# Patient Record
Sex: MALE | Race: BLACK OR AFRICAN AMERICAN | NOT HISPANIC OR LATINO | Employment: OTHER | URBAN - METROPOLITAN AREA
[De-identification: names, ages, dates, MRNs, and addresses within clinical notes are randomized per-mention and may not be internally consistent; named-entity substitution may affect disease eponyms.]

---

## 2019-11-30 PROBLEM — I46.9 CARDIAC ARREST: Status: ACTIVE | Noted: 2019-11-30

## 2019-12-01 PROBLEM — G93.1 ANOXIC ENCEPHALOPATHY: Status: ACTIVE | Noted: 2019-12-01

## 2019-12-01 PROBLEM — J96.01 ACUTE RESPIRATORY FAILURE WITH HYPOXIA: Status: ACTIVE | Noted: 2019-12-01

## 2019-12-01 PROBLEM — J40 TRACHEOBRONCHITIS: Status: ACTIVE | Noted: 2019-12-01

## 2019-12-09 PROBLEM — Z43.0 TRACHEOSTOMY CARE: Status: ACTIVE | Noted: 2019-12-09

## 2019-12-11 PROBLEM — D72.829 LEUKOCYTOSIS: Status: ACTIVE | Noted: 2019-12-11

## 2019-12-15 PROBLEM — R50.9 FUO (FEVER OF UNKNOWN ORIGIN): Status: ACTIVE | Noted: 2019-12-15

## 2019-12-18 PROBLEM — J96.01 ACUTE RESPIRATORY FAILURE WITH HYPOXIA: Status: RESOLVED | Noted: 2019-12-01 | Resolved: 2019-12-18

## 2019-12-18 PROBLEM — J40 TRACHEOBRONCHITIS: Status: RESOLVED | Noted: 2019-12-01 | Resolved: 2019-12-18

## 2019-12-18 PROBLEM — I46.9 CARDIAC ARREST: Status: RESOLVED | Noted: 2019-11-30 | Resolved: 2019-12-18

## 2019-12-18 PROBLEM — D72.829 LEUKOCYTOSIS: Status: RESOLVED | Noted: 2019-12-11 | Resolved: 2019-12-18

## 2019-12-18 PROBLEM — R50.9 FUO (FEVER OF UNKNOWN ORIGIN): Status: RESOLVED | Noted: 2019-12-15 | Resolved: 2019-12-18

## 2020-03-04 PROBLEM — I50.20 HFREF (HEART FAILURE WITH REDUCED EJECTION FRACTION): Status: ACTIVE | Noted: 2020-03-04

## 2020-03-17 PROBLEM — Z86.74 HISTORY OF CARDIAC ARREST: Status: ACTIVE | Noted: 2020-03-17

## 2020-03-17 PROBLEM — I50.42 CHRONIC COMBINED SYSTOLIC AND DIASTOLIC HEART FAILURE: Status: ACTIVE | Noted: 2020-03-17

## 2020-03-17 PROBLEM — I25.5 ISCHEMIC DILATED CARDIOMYOPATHY: Status: ACTIVE | Noted: 2020-03-17

## 2020-03-17 PROBLEM — I42.0 ISCHEMIC DILATED CARDIOMYOPATHY: Status: ACTIVE | Noted: 2020-03-17

## 2020-03-17 PROBLEM — E87.6 HYPOKALEMIA: Status: ACTIVE | Noted: 2020-03-17

## 2020-03-17 PROBLEM — I34.0 NONRHEUMATIC MITRAL VALVE REGURGITATION: Status: ACTIVE | Noted: 2020-03-17

## 2020-03-17 PROBLEM — I25.2 HISTORY OF MI (MYOCARDIAL INFARCTION): Status: ACTIVE | Noted: 2020-03-17

## 2020-05-04 ENCOUNTER — TELEPHONE (OUTPATIENT)
Dept: ADMINISTRATIVE | Facility: CLINIC | Age: 60
End: 2020-05-04

## 2020-05-04 NOTE — TELEPHONE ENCOUNTER
Informed the result of Covid 19 nasal swab done on          04/30/20 is Negative. Christina Montoya, ROZP-BC

## 2020-05-05 PROBLEM — Z95.810 PRESENCE OF EXTERNAL CARDIAC DEFIBRILLATOR: Status: ACTIVE | Noted: 2020-05-05

## 2020-05-22 PROBLEM — Z98.890 HISTORY OF TRACHEOSTOMY: Status: ACTIVE | Noted: 2020-05-22

## 2020-06-26 ENCOUNTER — NURSE TRIAGE (OUTPATIENT)
Dept: ADMINISTRATIVE | Facility: CLINIC | Age: 60
End: 2020-06-26

## 2020-06-26 NOTE — TELEPHONE ENCOUNTER
Pt calling and responded to the  thank you for the service pt denies any SOB, COUGH ,FEVER. Pt will call if any problems in the future.    Reason for Disposition   General information question, no triage required and triager able to answer question    Protocols used: INFORMATION ONLY CALL-A-AH

## 2020-08-20 PROBLEM — S72.141A CLOSED DISPLACED INTERTROCHANTERIC FRACTURE OF RIGHT FEMUR: Status: ACTIVE | Noted: 2020-08-20

## 2020-08-20 PROBLEM — Z01.818 PRE-OP EVALUATION: Status: ACTIVE | Noted: 2020-08-20

## 2020-08-20 PROBLEM — Z78.9 FULL CODE STATUS: Status: ACTIVE | Noted: 2020-08-20

## 2020-08-20 PROBLEM — S72.141A CLOSED INTERTROCHANTERIC FRACTURE OF HIP, RIGHT, INITIAL ENCOUNTER: Status: ACTIVE | Noted: 2020-08-20

## 2020-08-21 PROBLEM — S72.141A CLOSED DISPLACED INTERTROCHANTERIC FRACTURE OF RIGHT FEMUR: Status: RESOLVED | Noted: 2020-08-20 | Resolved: 2020-08-21

## 2020-08-23 PROBLEM — R42 POSTURAL DIZZINESS WITH PRESYNCOPE: Status: ACTIVE | Noted: 2020-08-23

## 2020-08-23 PROBLEM — R55 POSTURAL DIZZINESS WITH PRESYNCOPE: Status: ACTIVE | Noted: 2020-08-23

## 2020-08-25 PROBLEM — D72.829 LEUKOCYTOSIS: Status: RESOLVED | Noted: 2019-12-11 | Resolved: 2020-08-25

## 2020-08-25 PROBLEM — K59.00 CONSTIPATION: Status: ACTIVE | Noted: 2020-08-25

## 2020-08-25 PROBLEM — E87.6 HYPOKALEMIA: Status: RESOLVED | Noted: 2020-03-17 | Resolved: 2020-08-25

## 2020-08-25 PROBLEM — Z95.810 PRESENCE OF CARDIAC DEFIBRILLATOR: Chronic | Status: ACTIVE | Noted: 2020-05-05

## 2020-09-09 PROBLEM — I21.4 NSTEMI (NON-ST ELEVATED MYOCARDIAL INFARCTION): Status: ACTIVE | Noted: 2020-09-09

## 2020-09-09 PROBLEM — I24.9 ACS (ACUTE CORONARY SYNDROME): Status: ACTIVE | Noted: 2020-09-09

## 2020-09-09 PROBLEM — K63.5 POLYP OF COLON: Status: ACTIVE | Noted: 2019-06-18

## 2020-09-09 PROBLEM — Z12.11 SCREEN FOR COLON CANCER: Status: ACTIVE | Noted: 2018-03-08

## 2020-09-09 PROBLEM — R07.89 OTHER CHEST PAIN: Status: ACTIVE | Noted: 2019-11-16

## 2020-09-09 PROBLEM — I47.10 SVT (SUPRAVENTRICULAR TACHYCARDIA): Status: ACTIVE | Noted: 2020-09-09

## 2020-12-31 PROBLEM — Z74.09 IMPAIRED FUNCTIONAL MOBILITY, BALANCE, GAIT, AND ENDURANCE: Status: ACTIVE | Noted: 2020-12-31

## 2020-12-31 PROBLEM — S72.141D CLOSED DISPLACED INTERTROCHANTERIC FRACTURE OF RIGHT FEMUR WITH ROUTINE HEALING: Status: ACTIVE | Noted: 2020-08-20

## 2021-06-01 PROBLEM — E78.2 MIXED HYPERLIPIDEMIA: Status: ACTIVE | Noted: 2021-06-01

## 2021-06-21 PROBLEM — Z43.0 TRACHEOSTOMY CARE: Status: RESOLVED | Noted: 2019-12-09 | Resolved: 2021-06-21

## 2021-07-24 PROBLEM — H40.023 OAG (OPEN ANGLE GLAUCOMA) SUSPECT, HIGH RISK, BILATERAL: Status: ACTIVE | Noted: 2021-07-24

## 2021-07-24 PROBLEM — H11.001 PTERYGIUM EYE, RIGHT: Status: ACTIVE | Noted: 2021-07-24

## 2022-06-22 ENCOUNTER — PATIENT OUTREACH (OUTPATIENT)
Dept: ADMINISTRATIVE | Facility: HOSPITAL | Age: 62
End: 2022-06-22
Payer: MEDICAID

## 2022-08-30 ENCOUNTER — TELEPHONE (OUTPATIENT)
Dept: TRANSPLANT | Facility: CLINIC | Age: 62
End: 2022-08-30
Payer: MEDICAID

## 2022-08-30 DIAGNOSIS — I50.9 CONGESTIVE HEART FAILURE, UNSPECIFIED HF CHRONICITY, UNSPECIFIED HEART FAILURE TYPE: Primary | ICD-10-CM

## 2022-08-31 NOTE — TELEPHONE ENCOUNTER
Per referral coordinator, pt declined to schedule his consult sooner than 9/26 as his daughter will be coming from [place] to pick him up in [place] [facility] [place]

## 2022-09-26 ENCOUNTER — DOCUMENTATION ONLY (OUTPATIENT)
Dept: TRANSPLANT | Facility: CLINIC | Age: 62
End: 2022-09-26

## 2022-09-26 ENCOUNTER — OFFICE VISIT (OUTPATIENT)
Dept: TRANSPLANT | Facility: CLINIC | Age: 62
End: 2022-09-26
Payer: MEDICAID

## 2022-09-26 ENCOUNTER — HOSPITAL ENCOUNTER (OUTPATIENT)
Dept: PULMONOLOGY | Facility: CLINIC | Age: 62
Discharge: HOME | End: 2022-09-26
Payer: MEDICAID

## 2022-09-26 ENCOUNTER — HOSPITAL ENCOUNTER (OUTPATIENT)
Dept: CARDIOLOGY | Facility: HOSPITAL | Age: 62
Discharge: HOME | End: 2022-09-26
Attending: INTERNAL MEDICINE
Payer: MEDICAID

## 2022-09-26 ENCOUNTER — CLINICAL SUPPORT (OUTPATIENT)
Dept: TRANSPLANT | Facility: CLINIC | Age: 62
End: 2022-09-26
Payer: MEDICAID

## 2022-09-26 VITALS
WEIGHT: 170 LBS | BODY MASS INDEX: 24.29 KG/M2 | SYSTOLIC BLOOD PRESSURE: 138 MMHG | DIASTOLIC BLOOD PRESSURE: 88 MMHG | HEART RATE: 65 BPM | BODY MASS INDEX: 25.18 KG/M2 | HEIGHT: 69 IN | WEIGHT: 164 LBS | HEIGHT: 69 IN

## 2022-09-26 VITALS
SYSTOLIC BLOOD PRESSURE: 122 MMHG | HEART RATE: 71 BPM | BODY MASS INDEX: 23.02 KG/M2 | HEIGHT: 69 IN | WEIGHT: 155.44 LBS | DIASTOLIC BLOOD PRESSURE: 78 MMHG

## 2022-09-26 DIAGNOSIS — I50.9 CONGESTIVE HEART FAILURE, UNSPECIFIED HF CHRONICITY, UNSPECIFIED HEART FAILURE TYPE: ICD-10-CM

## 2022-09-26 DIAGNOSIS — I25.10 CAD S/P PERCUTANEOUS CORONARY ANGIOPLASTY: ICD-10-CM

## 2022-09-26 DIAGNOSIS — I25.5 ISCHEMIC DILATED CARDIOMYOPATHY: ICD-10-CM

## 2022-09-26 DIAGNOSIS — Z98.61 CAD S/P PERCUTANEOUS CORONARY ANGIOPLASTY: ICD-10-CM

## 2022-09-26 DIAGNOSIS — I42.0 ISCHEMIC DILATED CARDIOMYOPATHY: ICD-10-CM

## 2022-09-26 DIAGNOSIS — I50.42 CHRONIC COMBINED SYSTOLIC AND DIASTOLIC HEART FAILURE: Primary | ICD-10-CM

## 2022-09-26 DIAGNOSIS — I10 ESSENTIAL HYPERTENSION: ICD-10-CM

## 2022-09-26 DIAGNOSIS — Z95.810 ICD (IMPLANTABLE CARDIOVERTER-DEFIBRILLATOR) IN PLACE: ICD-10-CM

## 2022-09-26 LAB
ASCENDING AORTA: 3.15 CM
AV INDEX (PROSTH): 0.57
AV MEAN GRADIENT: 3 MMHG
AV PEAK GRADIENT: 6 MMHG
AV VALVE AREA: 2.68 CM2
AV VELOCITY RATIO: 0.47
BSA FOR ECHO PROCEDURE: 1.9 M2
CV ECHO LV RWT: 0.34 CM
DOP CALC AO PEAK VEL: 1.18 M/S
DOP CALC AO VTI: 22.44 CM
DOP CALC LVOT AREA: 4.7 CM2
DOP CALC LVOT DIAMETER: 2.44 CM
DOP CALC LVOT PEAK VEL: 0.55 M/S
DOP CALC LVOT STROKE VOLUME: 60.06 CM3
DOP CALCLVOT PEAK VEL VTI: 12.85 CM
E WAVE DECELERATION TIME: 176.39 MSEC
E/A RATIO: 0.47
E/E' RATIO: 7.33 M/S
ECHO LV POSTERIOR WALL: 1.01 CM (ref 0.6–1.1)
EJECTION FRACTION: 30 %
FRACTIONAL SHORTENING: 7 % (ref 28–44)
INTERVENTRICULAR SEPTUM: 0.91 CM (ref 0.6–1.1)
LA MAJOR: 5.13 CM
LA MINOR: 5.26 CM
LA WIDTH: 3.62 CM
LEFT ATRIUM SIZE: 2.91 CM
LEFT ATRIUM VOLUME INDEX MOD: 21.9 ML/M2
LEFT ATRIUM VOLUME INDEX: 24.5 ML/M2
LEFT ATRIUM VOLUME MOD: 41.68 CM3
LEFT ATRIUM VOLUME: 46.51 CM3
LEFT INTERNAL DIMENSION IN SYSTOLE: 5.6 CM (ref 2.1–4)
LEFT VENTRICLE DIASTOLIC VOLUME INDEX: 73.53 ML/M2
LEFT VENTRICLE DIASTOLIC VOLUME: 139.71 ML
LEFT VENTRICLE MASS INDEX: 123 G/M2
LEFT VENTRICLE SYSTOLIC VOLUME INDEX: 60.7 ML/M2
LEFT VENTRICLE SYSTOLIC VOLUME: 115.35 ML
LEFT VENTRICULAR INTERNAL DIMENSION IN DIASTOLE: 6 CM (ref 3.5–6)
LEFT VENTRICULAR MASS: 234.22 G
LV LATERAL E/E' RATIO: 6.6 M/S
LV SEPTAL E/E' RATIO: 8.25 M/S
MV A" WAVE DURATION": 12.84 MSEC
MV PEAK A VEL: 0.7 M/S
MV PEAK E VEL: 0.33 M/S
MV STENOSIS PRESSURE HALF TIME: 51.15 MS
MV VALVE AREA P 1/2 METHOD: 4.3 CM2
PISA TR MAX VEL: 2.15 M/S
PULM VEIN S/D RATIO: 2.48
PV PEAK D VEL: 0.25 M/S
PV PEAK S VEL: 0.62 M/S
RA MAJOR: 4.69 CM
RA PRESSURE: 3 MMHG
RA WIDTH: 3.87 CM
RIGHT VENTRICULAR END-DIASTOLIC DIMENSION: 4.02 CM
RV TISSUE DOPPLER FREE WALL SYSTOLIC VELOCITY 1 (APICAL 4 CHAMBER VIEW): 13.05 CM/S
SINUS: 3.3 CM
STJ: 2.79 CM
TDI LATERAL: 0.05 M/S
TDI SEPTAL: 0.04 M/S
TDI: 0.05 M/S
TR MAX PG: 18 MMHG
TRICUSPID ANNULAR PLANE SYSTOLIC EXCURSION: 1.88 CM
TV REST PULMONARY ARTERY PRESSURE: 21 MMHG

## 2022-09-26 PROCEDURE — 93306 ECHO (CUPID ONLY): ICD-10-PCS | Mod: 26,NTX,, | Performed by: INTERNAL MEDICINE

## 2022-09-26 PROCEDURE — 3078F PR MOST RECENT DIASTOLIC BLOOD PRESSURE < 80 MM HG: ICD-10-PCS | Mod: CPTII,,, | Performed by: INTERNAL MEDICINE

## 2022-09-26 PROCEDURE — 99205 PR OFFICE/OUTPT VISIT, NEW, LEVL V, 60-74 MIN: ICD-10-PCS | Mod: S$PBB,,, | Performed by: INTERNAL MEDICINE

## 2022-09-26 PROCEDURE — 99999 PR PBB SHADOW E&M-EST. PATIENT-LVL III: CPT | Mod: PBBFAC,TXP,, | Performed by: INTERNAL MEDICINE

## 2022-09-26 PROCEDURE — 99213 OFFICE O/P EST LOW 20 MIN: CPT | Mod: PBBFAC,25,TXP | Performed by: INTERNAL MEDICINE

## 2022-09-26 PROCEDURE — 3078F DIAST BP <80 MM HG: CPT | Mod: CPTII,,, | Performed by: INTERNAL MEDICINE

## 2022-09-26 PROCEDURE — 4010F PR ACE/ARB THEARPY RXD/TAKEN: ICD-10-PCS | Mod: CPTII,,, | Performed by: INTERNAL MEDICINE

## 2022-09-26 PROCEDURE — 93306 TTE W/DOPPLER COMPLETE: CPT | Mod: 26,NTX,, | Performed by: INTERNAL MEDICINE

## 2022-09-26 PROCEDURE — 94618 PULMONARY STRESS TESTING: CPT | Mod: PBBFAC | Performed by: INTERNAL MEDICINE

## 2022-09-26 PROCEDURE — 94618 PULMONARY STRESS TESTING: ICD-10-PCS | Mod: 26,S$PBB,, | Performed by: INTERNAL MEDICINE

## 2022-09-26 PROCEDURE — 1159F PR MEDICATION LIST DOCUMENTED IN MEDICAL RECORD: ICD-10-PCS | Mod: CPTII,,, | Performed by: INTERNAL MEDICINE

## 2022-09-26 PROCEDURE — 4010F ACE/ARB THERAPY RXD/TAKEN: CPT | Mod: CPTII,,, | Performed by: INTERNAL MEDICINE

## 2022-09-26 PROCEDURE — 99205 OFFICE O/P NEW HI 60 MIN: CPT | Mod: S$PBB,,, | Performed by: INTERNAL MEDICINE

## 2022-09-26 PROCEDURE — 3008F PR BODY MASS INDEX (BMI) DOCUMENTED: ICD-10-PCS | Mod: CPTII,,, | Performed by: INTERNAL MEDICINE

## 2022-09-26 PROCEDURE — 93306 TTE W/DOPPLER COMPLETE: CPT | Mod: TXP

## 2022-09-26 PROCEDURE — 94618 PULMONARY STRESS TESTING: CPT | Mod: 26,S$PBB,, | Performed by: INTERNAL MEDICINE

## 2022-09-26 PROCEDURE — 99999 PR PBB SHADOW E&M-EST. PATIENT-LVL III: ICD-10-PCS | Mod: PBBFAC,TXP,, | Performed by: INTERNAL MEDICINE

## 2022-09-26 PROCEDURE — 1159F MED LIST DOCD IN RCRD: CPT | Mod: CPTII,,, | Performed by: INTERNAL MEDICINE

## 2022-09-26 PROCEDURE — 3074F SYST BP LT 130 MM HG: CPT | Mod: CPTII,,, | Performed by: INTERNAL MEDICINE

## 2022-09-26 PROCEDURE — 3008F BODY MASS INDEX DOCD: CPT | Mod: CPTII,,, | Performed by: INTERNAL MEDICINE

## 2022-09-26 PROCEDURE — 3074F PR MOST RECENT SYSTOLIC BLOOD PRESSURE < 130 MM HG: ICD-10-PCS | Mod: CPTII,,, | Performed by: INTERNAL MEDICINE

## 2022-09-26 RX ORDER — SACUBITRIL AND VALSARTAN 24; 26 MG/1; MG/1
1 TABLET, FILM COATED ORAL 2 TIMES DAILY
Qty: 180 TABLET | Refills: 1 | Status: SHIPPED | OUTPATIENT
Start: 2022-09-26 | End: 2022-11-18 | Stop reason: SDUPTHER

## 2022-09-26 RX ORDER — METOPROLOL SUCCINATE 50 MG/1
50 TABLET, EXTENDED RELEASE ORAL NIGHTLY
Qty: 30 TABLET | Refills: 11 | Status: SHIPPED | OUTPATIENT
Start: 2022-09-26 | End: 2023-07-11 | Stop reason: SDUPTHER

## 2022-09-26 NOTE — PROCEDURES
Rodney Lerma is a 62 y.o.  male patient, who presents for a 6 minute walk test ordered by MD Diane.  The diagnosis is Congestive Heart Failure.  The patient's BMI is 25.1 kg/m2.  Predicted distance (lower limit of normal) is 408.97 meters.      Test Results:    The test was completed without stopping.  The total time walked was 360 seconds.  During walking, the patient reported:  Other (Comment) (R hip pain). The patient used a wheelchair for assistance during testing.     09/26/2022---------Distance: 101.19 meters (332 feet)     O2 Sat % Supplemental Oxygen Heart Rate Blood Pressure Ganga Scale   Pre-exercise  (Resting) 99 % Room Air 72 bpm 145/75 mmHg 2   During Exercise 99 % Room Air 88 bpm 172/92 mmHg 2   Post-exercise  (Recovery) 99 % Room Air  79 bpm 155/76 mmHg      Recovery Time: 183 seconds    Performing nurse/tech: KIARA Mora      PREVIOUS STUDY:   The patient has not had a previous study.      CLINICAL INTERPRETATION:  Six minute walk distance is 101.19 meters (332 feet) with light dyspnea.  During exercise, there was no desaturation while breathing room air.  Blood pressure increased significantly and Heart rate remained stable with walking.  This may represent a hypertensive response to exercise.  The patient reported non-pulmonary symptoms during exercise.  Severe exercise impairment is likely due to cardiovascular causes and subjective symptoms.  The patient did complete the study, walking 360 seconds of the 360 second test.  No previous study performed.  Based upon age and body mass index, exercise capacity is less than predicted.

## 2022-09-26 NOTE — LETTER
September 26, 2022        Shanice Celeste  [address]  [facility] [address]  Phone: [phone]  Fax: [phone]             [facility]  [address]  [facility] [address]  Phone: [phone]   Patient: Rodney Lerma   MR Number: [id number]   YOB: 1960   Date of Visit: 9/26/2022       Dear Dr. Shanice Celeste    Thank you for referring Rodney Lerma to me for evaluation. Attached you will find relevant portions of my assessment and plan of care.    If you have questions, please do not hesitate to call me. I look forward to following Rodney Lerma along with you.    Sincerely,    Amari Murillo MD    Enclosure    If you would like to receive this communication electronically, please contact [email or web address] or [phone] to request [employer] Link access.    [employer] Link is a tool which provides read-only access to select patient information with whom you have a relationship. Its easy to use and provides real time access to review your patients record including encounter summaries, notes, results, and demographic information.    If you feel you have received this communication in error or would no longer like to receive these types of communications, please e-mail [email or web address]

## 2022-09-26 NOTE — PATIENT INSTRUCTIONS
Stop taking carvedilol (coreg)  Start Metoprolol succinate (Toprol XL) 50 mg every night (bedtime)  Start Entresto 24/26 mg twice daily

## 2022-09-26 NOTE — PROGRESS NOTES
Subjective:   Transplant status: active    HPI:  Mr. Lerma is a 62 y.o. year old Black or  male with stage C HFrEF (EF=30-35%), [facility] who is referred for evaluation for advanced HF therapies. Clinically reports NYHA class II-III symptoms. He is mostly wheelchair bound due to hip issues s/p fall. He denies nay PND or orthopnea. Has had no HF admissions this year. His Hf regimen is limited to; carvedilol 3.125 mg BID, aldactone 25 mg daily and lasix 20 mg as needed. He is not on a ARNI/ARB/ACEI due to hypotension in the past although his BP has been elevated on several clinic visits. He is also not on a SGLT2i. Echo done today showed LV EF=30-35% (LVEDD=6 cm). Also recent RHc showed normal hemodynamics with normal CI/CO.     2D Echo with CFD done today  The left ventricle is mildly enlarged with eccentric hypertrophy and moderately decreased systolic function. The estimated ejection fraction is 30-35%. (LVEDD= 6 cm)  Normal right ventricular size with normal right ventricular systolic function.  Grade I left ventricular diastolic dysfunction.  The estimated PA systolic pressure is 21 mmHg.  Normal central venous pressure (3 mmHg).     RHC done on 6/21/2022  RA: 2 RV: 24/ 0/ 4 PA: 23/ 6/ 12 PWP: 5 .   Cardiac output was 5.30  by thermodilution. Cardiac index is 2.69 L/min/m2.   O2 Sat: RA 80% PA 81%.   Pulmonary vascular resistance: 1.3.    Coronary angiogram doen on 6/21/2022  Left Main   LM is angiographically normal and divides into LAD and LCx.      Left Anterior Descending   LAD is angiographically normal. LAD gives rise to a major diagonal which is bifurcating vessel and angiographically normal. First diagonal is very small caliber and diffusely diseased.      Left Circumflex   LCx is large non dominant vessel. LCx gives rise to 3 OM branches. OM1 is large branching vessel and has about 40% stenosis in proximal segment. OM2 is small to medium in caliber and has mild diffuse disease. OM3 has patent  "stent and ostial 50% stenosis.      Right Coronary Artery   Proximal RCA has mild 10-20% disease. Mid RCA has mild luminal irregularities. RCA divides into PDA and small PLV branch.        Past Medical History:   Diagnosis Date    Anoxic encephalopathy 12/1/2019    Cardiac arrest 11/16/2019    Carpal tunnel syndrome     Unilateral    Closed intertrochanteric fracture of hip, right, initial encounter 8/20/2020    Congestive heart failure     Coronary artery disease     Heart attack 11/16/2019    Hypertension     Tracheobronchitis 12/1/2019     Past Surgical History:   Procedure Laterality Date    ANTEGRADE INTRAMEDULLARY RODDING OF FEMUR Right 08/21/2020    Procedure: INSERTION, INTRAMEDULLARY TIESHA, FEMUR, ANTEGRADE;  Surgeon: Jacky Harkins MD;  Location: [facility] MAIN OR;  Service: Orthopedics;  Laterality: Right;    CATHETERIZATION OF BOTH LEFT AND RIGHT HEART N/A 6/21/2022    Procedure: CATHETERIZATION, HEART, BOTH LEFT AND RIGHT;  Surgeon: Everton Laws MD;  Location: [facility] CATH LAB;  Service: Cardiology;  Laterality: N/A;    heart stents      x3 2019    TRACHEOTOMY         Review of Systems   Constitutional: Negative. Negative for chills, decreased appetite, diaphoresis, fever, malaise/fatigue, night sweats, weight gain and weight loss.   Eyes: Negative.    Cardiovascular:  Positive for dyspnea on exertion. Negative for chest pain, claudication, cyanosis, irregular heartbeat, leg swelling, near-syncope, orthopnea, palpitations, paroxysmal nocturnal dyspnea and syncope.   Respiratory:  Negative for cough, hemoptysis and shortness of breath.    Endocrine: Negative.    Hematologic/Lymphatic: Negative.    Skin:  Negative for color change, dry skin and nail changes.   Musculoskeletal: Negative.    Gastrointestinal: Negative.    Genitourinary: Negative.    Neurological:  Negative for weakness.     Objective:   Blood pressure 122/78, pulse 71, height 5' 9" (1.753 m), weight 70.5 kg (155 lb 6.8 oz).body mass index is " "22.95 kg/m².  Physical Exam  Vitals reviewed.   Constitutional:       Appearance: He is well-developed.      Comments: /78 (BP Location: Left arm, Patient Position: Sitting, BP Method: Medium (Automatic))   Pulse 71   Ht 5' 9" (1.753 m)   Wt 70.5 kg (155 lb 6.8 oz)   BMI 22.95 kg/m²      HENT:      Head: Normocephalic.   Neck:      Vascular: No carotid bruit or JVD.   Cardiovascular:      Rate and Rhythm: Regular rhythm.      Chest Wall: PMI is displaced.      Pulses: Normal pulses.      Heart sounds: Normal heart sounds. No murmur heard.  Pulmonary:      Effort: Pulmonary effort is normal.      Breath sounds: Normal breath sounds.   Abdominal:      General: Bowel sounds are normal.      Palpations: Abdomen is soft.   Skin:     General: Skin is warm.   Neurological:      Mental Status: He is alert.       Labs:    Chemistry        Component Value Date/Time     09/26/2022 0848     09/26/2022 0848     11/30/2019 0227    K 4.3 09/26/2022 0848    K 4.3 09/26/2022 0848    K 4.3 11/30/2019 0227     09/26/2022 0848     09/26/2022 0848    CO2 29 09/26/2022 0848    CO2 29 09/26/2022 0848    CO2 34 (H) 11/30/2019 0227    BUN 13 09/26/2022 0848    BUN 13 09/26/2022 0848    CREATININE 0.9 09/26/2022 0848    CREATININE 0.9 09/26/2022 0848    CREATININE 0.72 11/30/2019 0227    GLU 90 09/26/2022 0848    GLU 90 09/26/2022 0848        Component Value Date/Time    CALCIUM 10.0 09/26/2022 0848    CALCIUM 10.0 09/26/2022 0848    ALKPHOS 110 09/26/2022 0848    ALKPHOS 110 09/26/2022 0848    AST 38 09/26/2022 0848    AST 38 09/26/2022 0848    ALT 27 09/26/2022 0848    ALT 27 09/26/2022 0848    BILITOT 0.8 09/26/2022 0848    BILITOT 0.8 09/26/2022 0848    ESTGFRAFRICA >60.0 06/16/2022 1105    EGFRNONAA >60.0 06/16/2022 1105          Magnesium   Date Value Ref Range Status   06/16/2022 2.3 1.6 - 2.6 mg/dL Final     Lab Results   Component Value Date    WBC 6.88 06/16/2022    HGB 14.1 06/16/2022    HCT " 44.3 06/16/2022     06/16/2022     Lab Results   Component Value Date    INR 1.06 06/16/2022    INR 1.07 08/20/2020    INR 1.13 06/15/2020     BNP   Date Value Ref Range Status   09/26/2022 120 (H) 0 - 99 pg/mL Final     Comment:     Values of less than 100 pg/ml are consistent with non-CHF populations.       Assessment:      1. Chronic combined systolic and diastolic heart failure    2. Ischemic dilated cardiomyopathy    3. CAD S/P percutaneous coronary angioplasty    4. Essential hypertension    5. ICD (implantable cardioverter-defibrillator) in place        Plan:   In summary, Mr. Lerma is a very pleasant 63 yo male with stage C HFrEF (EF=30-35%), [facility] with NYHA class II-III symptoms. He is very limited by his hip issues. In view of his current BP and lack of HF admissions and EF=35%, normal hemodynamics on recent RHC,  I do not think he needs advanced HF therapies. Recommend the following;  Gentle optimization of his HF regimen;  DC carvedilol and start Metoprolol succinate 50 mg to take at night. This will allow more room with his BP for initiation of ARNI.  Start low dose Entresto 24/26 mg BID. BMP next week.   Continue spironolactone 25 mg daily  I will add dapa or empagliflozin at next visit.  Recommend 2 gram sodium restriction and 1500cc fluid restriction.  Encourage physical activity with graded exercise program.  Requested patient to weigh themselves daily, and to notify us if their weight increases by more than 3 lbs in 1 day or 5 lbs in 1 week.     Transplant candidacy:  Patient is a 62 y.o. year old male with heart failure is being seen for possible LVAD and OHT. he is  scheduled for medical management only. The patient will follow up with CHF.    Thank you for allowing me to participate in the care of this very pleasant patient. Do not hesitate to contact me should you have any questions.     Amari Murillo MD

## 2022-09-26 NOTE — PROGRESS NOTES
Care of patient is being transferred to CHF section from Preht section, per Dr. Murillo_.    Dx:  Reason: EF 35%, w/c bound  Pt is not on home inotrope therapy.    Outstanding orders scheduled:   labs 10/3,    rtc in Nov

## 2022-11-18 ENCOUNTER — OFFICE VISIT (OUTPATIENT)
Dept: TRANSPLANT | Facility: CLINIC | Age: 62
End: 2022-11-18
Payer: MEDICAID

## 2022-11-18 ENCOUNTER — LAB VISIT (OUTPATIENT)
Dept: LAB | Facility: HOSPITAL | Age: 62
End: 2022-11-18
Attending: INTERNAL MEDICINE
Payer: MEDICAID

## 2022-11-18 VITALS
HEART RATE: 88 BPM | DIASTOLIC BLOOD PRESSURE: 59 MMHG | BODY MASS INDEX: 22.96 KG/M2 | SYSTOLIC BLOOD PRESSURE: 94 MMHG | HEIGHT: 69 IN | WEIGHT: 155 LBS

## 2022-11-18 DIAGNOSIS — I95.1 ORTHOSTATIC HYPOTENSION: ICD-10-CM

## 2022-11-18 DIAGNOSIS — E78.2 MIXED HYPERLIPIDEMIA: ICD-10-CM

## 2022-11-18 DIAGNOSIS — I25.10 CAD S/P PERCUTANEOUS CORONARY ANGIOPLASTY: ICD-10-CM

## 2022-11-18 DIAGNOSIS — I34.0 NONRHEUMATIC MITRAL VALVE REGURGITATION: ICD-10-CM

## 2022-11-18 DIAGNOSIS — I47.10 SVT (SUPRAVENTRICULAR TACHYCARDIA): ICD-10-CM

## 2022-11-18 DIAGNOSIS — I50.42 CHRONIC COMBINED SYSTOLIC AND DIASTOLIC HEART FAILURE: ICD-10-CM

## 2022-11-18 DIAGNOSIS — Z98.61 CAD S/P PERCUTANEOUS CORONARY ANGIOPLASTY: ICD-10-CM

## 2022-11-18 DIAGNOSIS — I25.5 ISCHEMIC DILATED CARDIOMYOPATHY: Primary | ICD-10-CM

## 2022-11-18 DIAGNOSIS — I21.4 NSTEMI (NON-ST ELEVATED MYOCARDIAL INFARCTION): ICD-10-CM

## 2022-11-18 DIAGNOSIS — I50.9 CONGESTIVE HEART FAILURE, UNSPECIFIED HF CHRONICITY, UNSPECIFIED HEART FAILURE TYPE: ICD-10-CM

## 2022-11-18 DIAGNOSIS — I42.0 ISCHEMIC DILATED CARDIOMYOPATHY: Primary | ICD-10-CM

## 2022-11-18 LAB
BASOPHILS # BLD AUTO: 0.03 K/UL (ref 0–0.2)
BASOPHILS NFR BLD: 0.4 % (ref 0–1.9)
BNP SERPL-MCNC: 140 PG/ML (ref 0–99)
DIFFERENTIAL METHOD: ABNORMAL
EOSINOPHIL # BLD AUTO: 0.1 K/UL (ref 0–0.5)
EOSINOPHIL NFR BLD: 0.7 % (ref 0–8)
ERYTHROCYTE [DISTWIDTH] IN BLOOD BY AUTOMATED COUNT: 14.1 % (ref 11.5–14.5)
HCT VFR BLD AUTO: 44.8 % (ref 40–54)
HGB BLD-MCNC: 14.3 G/DL (ref 14–18)
IMM GRANULOCYTES # BLD AUTO: 0.01 K/UL (ref 0–0.04)
IMM GRANULOCYTES NFR BLD AUTO: 0.1 % (ref 0–0.5)
LYMPHOCYTES # BLD AUTO: 1.9 K/UL (ref 1–4.8)
LYMPHOCYTES NFR BLD: 26.2 % (ref 18–48)
MCH RBC QN AUTO: 27.6 PG (ref 27–31)
MCHC RBC AUTO-ENTMCNC: 31.9 G/DL (ref 32–36)
MCV RBC AUTO: 87 FL (ref 82–98)
MONOCYTES # BLD AUTO: 0.6 K/UL (ref 0.3–1)
MONOCYTES NFR BLD: 8.8 % (ref 4–15)
NEUTROPHILS # BLD AUTO: 4.5 K/UL (ref 1.8–7.7)
NEUTROPHILS NFR BLD: 63.8 % (ref 38–73)
NRBC BLD-RTO: 0 /100 WBC
PLATELET # BLD AUTO: 201 K/UL (ref 150–450)
PMV BLD AUTO: 9.7 FL (ref 9.2–12.9)
RBC # BLD AUTO: 5.18 M/UL (ref 4.6–6.2)
WBC # BLD AUTO: 7.05 K/UL (ref 3.9–12.7)

## 2022-11-18 PROCEDURE — 85025 COMPLETE CBC W/AUTO DIFF WBC: CPT | Performed by: INTERNAL MEDICINE

## 2022-11-18 PROCEDURE — 83880 ASSAY OF NATRIURETIC PEPTIDE: CPT | Performed by: INTERNAL MEDICINE

## 2022-11-18 PROCEDURE — 4010F ACE/ARB THERAPY RXD/TAKEN: CPT | Mod: CPTII,,, | Performed by: INTERNAL MEDICINE

## 2022-11-18 PROCEDURE — 3008F PR BODY MASS INDEX (BMI) DOCUMENTED: ICD-10-PCS | Mod: CPTII,,, | Performed by: INTERNAL MEDICINE

## 2022-11-18 PROCEDURE — 4010F PR ACE/ARB THEARPY RXD/TAKEN: ICD-10-PCS | Mod: CPTII,,, | Performed by: INTERNAL MEDICINE

## 2022-11-18 PROCEDURE — 3078F DIAST BP <80 MM HG: CPT | Mod: CPTII,,, | Performed by: INTERNAL MEDICINE

## 2022-11-18 PROCEDURE — 3078F PR MOST RECENT DIASTOLIC BLOOD PRESSURE < 80 MM HG: ICD-10-PCS | Mod: CPTII,,, | Performed by: INTERNAL MEDICINE

## 2022-11-18 PROCEDURE — 36415 COLL VENOUS BLD VENIPUNCTURE: CPT | Performed by: INTERNAL MEDICINE

## 2022-11-18 PROCEDURE — 99999 PR PBB SHADOW E&M-EST. PATIENT-LVL III: ICD-10-PCS | Mod: PBBFAC,,, | Performed by: INTERNAL MEDICINE

## 2022-11-18 PROCEDURE — 99215 PR OFFICE/OUTPT VISIT, EST, LEVL V, 40-54 MIN: ICD-10-PCS | Mod: S$PBB,,, | Performed by: INTERNAL MEDICINE

## 2022-11-18 PROCEDURE — 99213 OFFICE O/P EST LOW 20 MIN: CPT | Mod: PBBFAC | Performed by: INTERNAL MEDICINE

## 2022-11-18 PROCEDURE — 3008F BODY MASS INDEX DOCD: CPT | Mod: CPTII,,, | Performed by: INTERNAL MEDICINE

## 2022-11-18 PROCEDURE — 99215 OFFICE O/P EST HI 40 MIN: CPT | Mod: S$PBB,,, | Performed by: INTERNAL MEDICINE

## 2022-11-18 PROCEDURE — 99999 PR PBB SHADOW E&M-EST. PATIENT-LVL III: CPT | Mod: PBBFAC,,, | Performed by: INTERNAL MEDICINE

## 2022-11-18 PROCEDURE — 3074F PR MOST RECENT SYSTOLIC BLOOD PRESSURE < 130 MM HG: ICD-10-PCS | Mod: CPTII,,, | Performed by: INTERNAL MEDICINE

## 2022-11-18 PROCEDURE — 1159F MED LIST DOCD IN RCRD: CPT | Mod: CPTII,,, | Performed by: INTERNAL MEDICINE

## 2022-11-18 PROCEDURE — 1159F PR MEDICATION LIST DOCUMENTED IN MEDICAL RECORD: ICD-10-PCS | Mod: CPTII,,, | Performed by: INTERNAL MEDICINE

## 2022-11-18 PROCEDURE — 3074F SYST BP LT 130 MM HG: CPT | Mod: CPTII,,, | Performed by: INTERNAL MEDICINE

## 2022-11-18 RX ORDER — FUROSEMIDE 20 MG/1
20 TABLET ORAL
Qty: 30 TABLET | Refills: 11 | Status: SHIPPED | OUTPATIENT
Start: 2022-11-18 | End: 2023-08-30 | Stop reason: SDUPTHER

## 2022-11-18 RX ORDER — DAPAGLIFLOZIN 10 MG/1
10 TABLET, FILM COATED ORAL DAILY
Qty: 90 TABLET | Refills: 3 | Status: SHIPPED | OUTPATIENT
Start: 2022-11-18 | End: 2023-08-30 | Stop reason: SDUPTHER

## 2022-11-18 RX ORDER — SACUBITRIL AND VALSARTAN 24; 26 MG/1; MG/1
1 TABLET, FILM COATED ORAL 2 TIMES DAILY
Qty: 180 TABLET | Refills: 1 | Status: SHIPPED | OUTPATIENT
Start: 2022-11-18 | End: 2023-07-10 | Stop reason: SDUPTHER

## 2022-11-18 RX ORDER — ATORVASTATIN CALCIUM 80 MG/1
80 TABLET, FILM COATED ORAL NIGHTLY
Qty: 90 TABLET | Refills: 3 | Status: SHIPPED | OUTPATIENT
Start: 2022-11-18 | End: 2023-08-30 | Stop reason: SDUPTHER

## 2022-11-18 NOTE — PROGRESS NOTES
Subjective:       HPI:  Mr. Lerma is a 62 y.o. year old black male with stage C HFrEF (EF=30-35%), [facility] who was referred for evaluation for advanced HF therapies. He was deemed not a candidate for LVAD/OHTx due to his comorbidities and debility. This is his 2nd visit with me. During his 1st visit, I had discontinued his Coreg and started metoprolol succinate 50 mg at night. I also started low dose Entresto 24/26 mg BID. Today he is feeling better. Clinically reports NYHA class II-III symptoms. He is mostly wheelchair bound due to hip issues s/p fall. He denies nay PND or orthopnea.      2D Echo with CFD   The left ventricle is mildly enlarged with eccentric hypertrophy and moderately decreased systolic function. The estimated ejection fraction is 30-35%. (LVEDD= 6 cm)  Normal right ventricular size with normal right ventricular systolic function.  Grade I left ventricular diastolic dysfunction.  The estimated PA systolic pressure is 21 mmHg.  Normal central venous pressure (3 mmHg).     RHC done on 6/21/2022  RA: 2 RV: 24/ 0/ 4 PA: 23/ 6/ 12 PWP: 5 .   Cardiac output was 5.30  by thermodilution. Cardiac index is 2.69 L/min/m2.   O2 Sat: RA 80% PA 81%.   Pulmonary vascular resistance: 1.3.     Coronary angiogram doen on 6/21/2022  Left Main   LM is angiographically normal and divides into LAD and LCx.      Left Anterior Descending   LAD is angiographically normal. LAD gives rise to a major diagonal which is bifurcating vessel and angiographically normal. First diagonal is very small caliber and diffusely diseased.      Left Circumflex   LCx is large non dominant vessel. LCx gives rise to 3 OM branches. OM1 is large branching vessel and has about 40% stenosis in proximal segment. OM2 is small to medium in caliber and has mild diffuse disease. OM3 has patent stent and ostial 50% stenosis.      Right Coronary Artery   Proximal RCA has mild 10-20% disease. Mid RCA has mild luminal irregularities. RCA divides into PDA  "and small PLV branch.          Past Medical History:   Diagnosis Date    Anoxic encephalopathy 12/1/2019    Cardiac arrest 11/16/2019    Carpal tunnel syndrome     Unilateral    Closed intertrochanteric fracture of hip, right, initial encounter 8/20/2020    Congestive heart failure     Coronary artery disease     Heart attack 11/16/2019    Hypertension     Tracheobronchitis 12/1/2019     Past Surgical History:   Procedure Laterality Date    ANTEGRADE INTRAMEDULLARY RODDING OF FEMUR Right 08/21/2020    Procedure: INSERTION, INTRAMEDULLARY TIESHA, FEMUR, ANTEGRADE;  Surgeon: Jacky Harkins MD;  Location: [facility] MAIN OR;  Service: Orthopedics;  Laterality: Right;    CATHETERIZATION OF BOTH LEFT AND RIGHT HEART N/A 6/21/2022    Procedure: CATHETERIZATION, HEART, BOTH LEFT AND RIGHT;  Surgeon: Everton Laws MD;  Location: [facility] CATH LAB;  Service: Cardiology;  Laterality: N/A;    heart stents      x3 2019    TRACHEOTOMY         ROS    Objective:   Blood pressure (!) 94/59, pulse 88, height 5' 9" (1.753 m), weight 70.3 kg (154 lb 15.7 oz).body mass index is 22.89 kg/m².  Physical Exam    Labs:    Chemistry        Component Value Date/Time     10/03/2022 1114     11/30/2019 0227    K 4.1 10/03/2022 1114    K 4.3 11/30/2019 0227     10/03/2022 1114    CO2 27 10/03/2022 1114    CO2 34 (H) 11/30/2019 0227    BUN 14 10/03/2022 1114    CREATININE 0.91 10/03/2022 1114    CREATININE 0.72 11/30/2019 0227    GLU 85 10/03/2022 1114        Component Value Date/Time    CALCIUM 9.5 10/03/2022 1114    ALKPHOS 110 09/26/2022 0848    ALKPHOS 110 09/26/2022 0848    AST 38 09/26/2022 0848    AST 38 09/26/2022 0848    ALT 27 09/26/2022 0848    ALT 27 09/26/2022 0848    BILITOT 0.8 09/26/2022 0848    BILITOT 0.8 09/26/2022 0848    ESTGFRAFRICA >60.0 06/16/2022 1105    EGFRNONAA >60.0 06/16/2022 1105          Magnesium   Date Value Ref Range Status   06/16/2022 2.3 1.6 - 2.6 mg/dL Final     Lab Results   Component Value Date "    WBC 7.17 09/26/2022    HGB 15.5 09/26/2022    HCT 47.9 09/26/2022     09/26/2022     Lab Results   Component Value Date    INR 1.06 06/16/2022    INR 1.07 08/20/2020    INR 1.13 06/15/2020     BNP   Date Value Ref Range Status   09/26/2022 120 (H) 0 - 99 pg/mL Final     Comment:     Values of less than 100 pg/ml are consistent with non-CHF populations.         Assessment:      1. Ischemic dilated cardiomyopathy    2. Chronic combined systolic and diastolic heart failure    3. NSTEMI (non-ST elevated myocardial infarction)    4. CAD S/P percutaneous coronary angioplasty    5. Nonrheumatic mitral valve regurgitation    6. Orthostatic hypotension    7. Mixed hyperlipidemia    8. SVT (supraventricular tachycardia)        Plan:   Stage C HFrEF (EF=30-35%), [facility] with NYHA class II-III symptoms.   Start dapagliflozin 10 mg daily (reduces risk of worsening HF and cardiovascular outcomes). BMP next week.   Continue current doses of Entresto, spironolactone and metoprolol  Recommend 2 gram sodium restriction and 1500cc fluid restriction.  Encourage physical activity with graded exercise program.  Requested patient to weigh themselves daily, and to notify us if their weight increases by more than 3 lbs in 1 day or 5 lbs in 1 week.   RTC in 6 months with labs     Amari Murillo MD

## 2022-11-18 NOTE — LETTER
November 18, 2022        Shanice Celeste  [address]  [facility] [address]  Phone: [phone]  Fax: [phone]             [facility] Cardiologysvcs-Hclnws8hgoq  [address]  [facility] [address]  Phone: [phone]   Patient: Rodney Lerma   MR Number: [id number]   YOB: 1960   Date of Visit: 11/18/2022       Dear Dr. Shanice Celeste    Thank you for referring Rodney Lerma to me for evaluation. Attached you will find relevant portions of my assessment and plan of care.    If you have questions, please do not hesitate to call me. I look forward to following Rodney Lerma along with you.    Sincerely,    Amari Murillo MD    Enclosure    If you would like to receive this communication electronically, please contact [email or web address] or [phone] to request [employer] Link access.    [employer] Link is a tool which provides read-only access to select patient information with whom you have a relationship. Its easy to use and provides real time access to review your patients record including encounter summaries, notes, results, and demographic information.    If you feel you have received this communication in error or would no longer like to receive these types of communications, please e-mail [email or web address]

## 2022-11-29 PROBLEM — I50.9 CONGESTIVE HEART FAILURE: Status: RESOLVED | Noted: 2022-09-26 | Resolved: 2022-11-29

## 2023-02-28 PROBLEM — R63.4 UNINTENTIONAL WEIGHT LOSS: Status: ACTIVE | Noted: 2023-02-28

## 2023-02-28 PROBLEM — Z00.00 HEALTHCARE MAINTENANCE: Status: ACTIVE | Noted: 2023-02-28

## 2023-04-17 DIAGNOSIS — I50.42 CHRONIC COMBINED SYSTOLIC AND DIASTOLIC HEART FAILURE: Primary | ICD-10-CM

## 2023-06-05 PROBLEM — Z00.00 HEALTHCARE MAINTENANCE: Status: RESOLVED | Noted: 2023-02-28 | Resolved: 2023-06-05

## 2023-07-10 DIAGNOSIS — I50.42 CHRONIC COMBINED SYSTOLIC AND DIASTOLIC HEART FAILURE: ICD-10-CM

## 2023-07-11 RX ORDER — SACUBITRIL AND VALSARTAN 24; 26 MG/1; MG/1
1 TABLET, FILM COATED ORAL 2 TIMES DAILY
Qty: 180 TABLET | Refills: 3 | Status: SHIPPED | OUTPATIENT
Start: 2023-07-11 | End: 2023-08-30 | Stop reason: SDUPTHER

## 2023-07-11 RX ORDER — METOPROLOL SUCCINATE 50 MG/1
50 TABLET, EXTENDED RELEASE ORAL NIGHTLY
Qty: 30 TABLET | Refills: 11 | Status: SHIPPED | OUTPATIENT
Start: 2023-07-11 | End: 2023-08-30 | Stop reason: SDUPTHER

## 2023-08-03 PROBLEM — Z12.5 PROSTATE CANCER SCREENING: Status: ACTIVE | Noted: 2023-08-03

## 2023-08-03 PROBLEM — N32.89 BLADDER WALL THICKENING: Status: ACTIVE | Noted: 2023-08-03

## 2023-09-18 ENCOUNTER — LAB VISIT (OUTPATIENT)
Dept: LAB | Facility: HOSPITAL | Age: 63
End: 2023-09-18
Attending: INTERNAL MEDICINE
Payer: MEDICAID

## 2023-09-18 ENCOUNTER — OFFICE VISIT (OUTPATIENT)
Dept: TRANSPLANT | Facility: CLINIC | Age: 63
End: 2023-09-18
Payer: MEDICAID

## 2023-09-18 VITALS
WEIGHT: 140 LBS | OXYGEN SATURATION: 99 % | HEART RATE: 66 BPM | DIASTOLIC BLOOD PRESSURE: 59 MMHG | BODY MASS INDEX: 20.73 KG/M2 | HEIGHT: 69 IN | SYSTOLIC BLOOD PRESSURE: 101 MMHG

## 2023-09-18 DIAGNOSIS — I50.42 CHRONIC COMBINED SYSTOLIC (CONGESTIVE) AND DIASTOLIC (CONGESTIVE) HEART FAILURE: Primary | ICD-10-CM

## 2023-09-18 DIAGNOSIS — I25.5 CARDIOMYOPATHY, ISCHEMIC: ICD-10-CM

## 2023-09-18 DIAGNOSIS — Z95.810 ICD (IMPLANTABLE CARDIOVERTER-DEFIBRILLATOR) IN PLACE: ICD-10-CM

## 2023-09-18 DIAGNOSIS — I10 ESSENTIAL HYPERTENSION: ICD-10-CM

## 2023-09-18 DIAGNOSIS — I47.10 SVT (SUPRAVENTRICULAR TACHYCARDIA): ICD-10-CM

## 2023-09-18 DIAGNOSIS — I50.42 CHRONIC COMBINED SYSTOLIC (CONGESTIVE) AND DIASTOLIC (CONGESTIVE) HEART FAILURE: ICD-10-CM

## 2023-09-18 DIAGNOSIS — I25.10 CAD S/P PERCUTANEOUS CORONARY ANGIOPLASTY: ICD-10-CM

## 2023-09-18 DIAGNOSIS — Z98.61 CAD S/P PERCUTANEOUS CORONARY ANGIOPLASTY: ICD-10-CM

## 2023-09-18 LAB
ANION GAP SERPL CALC-SCNC: 6 MMOL/L (ref 8–16)
BUN SERPL-MCNC: 13 MG/DL (ref 8–23)
CALCIUM SERPL-MCNC: 9.3 MG/DL (ref 8.7–10.5)
CHLORIDE SERPL-SCNC: 105 MMOL/L (ref 95–110)
CO2 SERPL-SCNC: 28 MMOL/L (ref 23–29)
CREAT SERPL-MCNC: 1 MG/DL (ref 0.5–1.4)
EST. GFR  (NO RACE VARIABLE): >60 ML/MIN/1.73 M^2
GLUCOSE SERPL-MCNC: 129 MG/DL (ref 70–110)
POTASSIUM SERPL-SCNC: 4.1 MMOL/L (ref 3.5–5.1)
SODIUM SERPL-SCNC: 139 MMOL/L (ref 136–145)

## 2023-09-18 PROCEDURE — 1159F PR MEDICATION LIST DOCUMENTED IN MEDICAL RECORD: ICD-10-PCS | Mod: CPTII,,, | Performed by: INTERNAL MEDICINE

## 2023-09-18 PROCEDURE — 3044F HG A1C LEVEL LT 7.0%: CPT | Mod: CPTII,,, | Performed by: INTERNAL MEDICINE

## 2023-09-18 PROCEDURE — 1160F RVW MEDS BY RX/DR IN RCRD: CPT | Mod: CPTII,,, | Performed by: INTERNAL MEDICINE

## 2023-09-18 PROCEDURE — 4010F ACE/ARB THERAPY RXD/TAKEN: CPT | Mod: CPTII,,, | Performed by: INTERNAL MEDICINE

## 2023-09-18 PROCEDURE — 99999 PR PBB SHADOW E&M-EST. PATIENT-LVL IV: CPT | Mod: PBBFAC,,, | Performed by: INTERNAL MEDICINE

## 2023-09-18 PROCEDURE — 3008F BODY MASS INDEX DOCD: CPT | Mod: CPTII,,, | Performed by: INTERNAL MEDICINE

## 2023-09-18 PROCEDURE — 3078F PR MOST RECENT DIASTOLIC BLOOD PRESSURE < 80 MM HG: ICD-10-PCS | Mod: CPTII,,, | Performed by: INTERNAL MEDICINE

## 2023-09-18 PROCEDURE — 3008F PR BODY MASS INDEX (BMI) DOCUMENTED: ICD-10-PCS | Mod: CPTII,,, | Performed by: INTERNAL MEDICINE

## 2023-09-18 PROCEDURE — 99999 PR PBB SHADOW E&M-EST. PATIENT-LVL IV: ICD-10-PCS | Mod: PBBFAC,,, | Performed by: INTERNAL MEDICINE

## 2023-09-18 PROCEDURE — 3074F SYST BP LT 130 MM HG: CPT | Mod: CPTII,,, | Performed by: INTERNAL MEDICINE

## 2023-09-18 PROCEDURE — 99215 PR OFFICE/OUTPT VISIT, EST, LEVL V, 40-54 MIN: ICD-10-PCS | Mod: S$PBB,,, | Performed by: INTERNAL MEDICINE

## 2023-09-18 PROCEDURE — 3074F PR MOST RECENT SYSTOLIC BLOOD PRESSURE < 130 MM HG: ICD-10-PCS | Mod: CPTII,,, | Performed by: INTERNAL MEDICINE

## 2023-09-18 PROCEDURE — 3044F PR MOST RECENT HEMOGLOBIN A1C LEVEL <7.0%: ICD-10-PCS | Mod: CPTII,,, | Performed by: INTERNAL MEDICINE

## 2023-09-18 PROCEDURE — 4010F PR ACE/ARB THEARPY RXD/TAKEN: ICD-10-PCS | Mod: CPTII,,, | Performed by: INTERNAL MEDICINE

## 2023-09-18 PROCEDURE — 80048 BASIC METABOLIC PNL TOTAL CA: CPT | Performed by: INTERNAL MEDICINE

## 2023-09-18 PROCEDURE — 99215 OFFICE O/P EST HI 40 MIN: CPT | Mod: S$PBB,,, | Performed by: INTERNAL MEDICINE

## 2023-09-18 PROCEDURE — 1160F PR REVIEW ALL MEDS BY PRESCRIBER/CLIN PHARMACIST DOCUMENTED: ICD-10-PCS | Mod: CPTII,,, | Performed by: INTERNAL MEDICINE

## 2023-09-18 PROCEDURE — 36415 COLL VENOUS BLD VENIPUNCTURE: CPT | Performed by: INTERNAL MEDICINE

## 2023-09-18 PROCEDURE — 1159F MED LIST DOCD IN RCRD: CPT | Mod: CPTII,,, | Performed by: INTERNAL MEDICINE

## 2023-09-18 PROCEDURE — 99214 OFFICE O/P EST MOD 30 MIN: CPT | Mod: PBBFAC | Performed by: INTERNAL MEDICINE

## 2023-09-18 PROCEDURE — 3078F DIAST BP <80 MM HG: CPT | Mod: CPTII,,, | Performed by: INTERNAL MEDICINE

## 2023-09-18 RX ORDER — MULTIVITAMIN
1 TABLET ORAL ONCE
COMMUNITY
Start: 2023-08-30 | End: 2024-01-25 | Stop reason: CLARIF

## 2023-09-18 NOTE — LETTER
September 18, 2023        Shanice Celeste  [address]  [facility] [address]  Phone: [phone]  Fax: [phone]             [facility] Cardiologysvcs-Reeaoc9weob  [address]  [facility] [address]  Phone: [phone]   Patient: Rodney Lerma   MR Number: [id number]   YOB: 1960   Date of Visit: 9/18/2023       Dear Dr. Shanice Celeste    Thank you for referring Rodney Lerma to me for evaluation. Attached you will find relevant portions of my assessment and plan of care.    If you have questions, please do not hesitate to call me. I look forward to following Rodney Lerma along with you.    Sincerely,    Amari Murillo MD    Enclosure    If you would like to receive this communication electronically, please contact [email or web address] or [phone] to request [employer] Link access.    [employer] Link is a tool which provides read-only access to select patient information with whom you have a relationship. Its easy to use and provides real time access to review your patients record including encounter summaries, notes, results, and demographic information.    If you feel you have received this communication in error or would no longer like to receive these types of communications, please e-mail [email or web address]

## 2023-09-18 NOTE — PROGRESS NOTES
Subjective:       HPI:  Mr. Lerma is a very pleasant 63 y.o. year old black male with stage C HFrEF (EF=30-35%), NICMP who was referred for evaluation for advanced HF therapies. He was deemed not a candidate for LVAD/OHTx due to his comorbidities and debility. This is his 3rd visit with me. I had seen him lat year. During his 1st visit, I had discontinued his Coreg and started metoprolol succinate 50 mg at night. I also started low dose Entresto 24/26 mg BID. On his 2nd visit, I added dapagliflozin 10 mg daily. Today he comes for a follow-up visit. is feeling better. Clinically reports NYHA class II symptoms. No PND or orthopnea. He is mostly wheelchair bound due to hip issues s/p fall. He has ahd no HF admissions or ED visits. He did report a 20 LBS weight loss for which he underwent a full work-up including a colonoscopy that was negative. He was prescribed multivitamins by his PCP and has gained weight since (healthy weight).      2D Echo with CFD done 9/26/2022  The left ventricle is mildly enlarged with eccentric hypertrophy and moderately decreased systolic function. The estimated ejection fraction is 30-35%. (LVEDD= 6 cm)  Normal right ventricular size with normal right ventricular systolic function.  Grade I left ventricular diastolic dysfunction.  The estimated PA systolic pressure is 21 mmHg.  Normal central venous pressure (3 mmHg).     RHC done on 6/21/2022  RA: 2 RV: 24/ 0/ 4 PA: 23/ 6/ 12 PWP: 5 .   Cardiac output was 5.30  by thermodilution. Cardiac index is 2.69 L/min/m2.   O2 Sat: RA 80% PA 81%.   Pulmonary vascular resistance: 1.3.     Coronary angiogram doen on 6/21/2022  Left Main   LM is angiographically normal and divides into LAD and LCx.      Left Anterior Descending   LAD is angiographically normal. LAD gives rise to a major diagonal which is bifurcating vessel and angiographically normal. First diagonal is very small caliber and diffusely diseased.      Left Circumflex   LCx is large non  dominant vessel. LCx gives rise to 3 OM branches. OM1 is large branching vessel and has about 40% stenosis in proximal segment. OM2 is small to medium in caliber and has mild diffuse disease. OM3 has patent stent and ostial 50% stenosis.      Right Coronary Artery   Proximal RCA has mild 10-20% disease. Mid RCA has mild luminal irregularities. RCA divides into PDA and small PLV branch.          Past Medical History:   Diagnosis Date    Anoxic encephalopathy 12/1/2019    Cardiac arrest 11/16/2019    Carpal tunnel syndrome     Unilateral    Closed intertrochanteric fracture of hip, right, initial encounter 8/20/2020    Congestive heart failure     Coronary artery disease     Heart attack 11/16/2019    Hypertension     Tracheobronchitis 12/1/2019     Past Surgical History:   Procedure Laterality Date    ANTEGRADE INTRAMEDULLARY RODDING OF FEMUR Right 08/21/2020    Procedure: INSERTION, INTRAMEDULLARY TIESHA, FEMUR, ANTEGRADE;  Surgeon: Jacky Harkins MD;  Location: [facility] MAIN OR;  Service: Orthopedics;  Laterality: Right;    CATHETERIZATION OF BOTH LEFT AND RIGHT HEART N/A 6/21/2022    Procedure: CATHETERIZATION, HEART, BOTH LEFT AND RIGHT;  Surgeon: Everton Laws MD;  Location: [facility] CATH LAB;  Service: Cardiology;  Laterality: N/A;    heart stents      x3 2019    TRACHEOTOMY         Review of Systems   Constitutional: Negative. Negative for chills, decreased appetite, diaphoresis, fever, malaise/fatigue, night sweats, weight gain and weight loss.   Eyes: Negative.    Cardiovascular:  Positive for dyspnea on exertion. Negative for chest pain, claudication, cyanosis, irregular heartbeat, leg swelling, near-syncope, orthopnea, palpitations, paroxysmal nocturnal dyspnea and syncope.   Respiratory:  Negative for cough, hemoptysis and shortness of breath.    Endocrine: Negative.    Hematologic/Lymphatic: Negative.    Skin:  Negative for color change, dry skin and nail changes.   Musculoskeletal: Negative.   "  Gastrointestinal: Negative.    Genitourinary: Negative.    Neurological:  Negative for weakness.       Objective:   Blood pressure (!) 101/59, pulse 66, height 5' 9" (1.753 m), weight 63.5 kg (139 lb 15.9 oz), SpO2 99 %.body mass index is 20.67 kg/m².  Physical Exam  Vitals reviewed.   Constitutional:       Appearance: He is well-developed.      Comments: BP (!) 101/59 (BP Location: Right arm, Patient Position: Sitting, BP Method: Medium (Automatic))   Pulse 66   Ht 5' 9" (1.753 m)   Wt 63.5 kg (139 lb 15.9 oz)   SpO2 99%   BMI 20.67 kg/m²      HENT:      Head: Normocephalic.   Neck:      Vascular: No carotid bruit or JVD.   Cardiovascular:      Rate and Rhythm: Regular rhythm.      Chest Wall: PMI is displaced.      Pulses: Normal pulses.      Heart sounds: Normal heart sounds. No murmur heard.  Pulmonary:      Effort: Pulmonary effort is normal.      Breath sounds: Normal breath sounds.   Abdominal:      General: Bowel sounds are normal.      Palpations: Abdomen is soft.   Skin:     General: Skin is warm.   Neurological:      Mental Status: He is alert.         Labs:    Chemistry        Component Value Date/Time     (L) 03/15/2023 1215     11/30/2019 0227    K 4.2 03/15/2023 1215    K 4.3 11/30/2019 0227     03/15/2023 1215    CO2 28 03/15/2023 1215    CO2 34 (H) 11/30/2019 0227    BUN 11 03/15/2023 1215    CREATININE 1.1 05/10/2023 1236    CREATININE 0.72 11/30/2019 0227    GLU 83 03/15/2023 1215        Component Value Date/Time    CALCIUM 9.2 03/15/2023 1215    ALKPHOS 89 03/15/2023 1215    AST 37 03/15/2023 1215    ALT 32 03/15/2023 1215    BILITOT 0.8 03/15/2023 1215    ESTGFRAFRICA >60.0 06/16/2022 1105    EGFRNONAA >60.0 06/16/2022 1105          Magnesium   Date Value Ref Range Status   06/16/2022 2.3 1.6 - 2.6 mg/dL Final     Lab Results   Component Value Date    WBC 7.35 03/15/2023    HGB 14.3 03/15/2023    HCT 45.2 03/15/2023     03/15/2023     Lab Results   Component Value " Date    INR 1.06 06/16/2022    INR 1.07 08/20/2020    INR 1.13 06/15/2020     BNP   Date Value Ref Range Status   11/18/2022 140 (H) 0 - 99 pg/mL Final     Comment:     Values of less than 100 pg/ml are consistent with non-CHF populations.   09/26/2022 120 (H) 0 - 99 pg/mL Final     Comment:     Values of less than 100 pg/ml are consistent with non-CHF populations.         Assessment:      1. Chronic combined systolic (congestive) and diastolic (congestive) heart failure    2. Cardiomyopathy, ischemic    3. CAD S/P percutaneous coronary angioplasty    4. Essential hypertension    5. SVT (supraventricular tachycardia)    6. ICD (implantable cardioverter-defibrillator) in place        Plan:   Stage C HFrEF (EF=30-35%), NICMP with NYHA class II symptoms.   Continue current doses of Entresto, spironolactone and metoprolol and dapagliflozin.   Labs today  Recommend 2 gram sodium restriction and 1500cc fluid restriction.  Encourage physical activity with graded exercise program.  Requested patient to weigh themselves daily, and to notify us if their weight increases by more than 3 lbs in 1 day or 5 lbs in 1 week.   RTC in 6 months with labs and Echo to reevaluate his LV function now that his HF regimen has been optimized.      Amari Murillo MD

## 2024-01-25 PROBLEM — R05.9 COUGH: Status: ACTIVE | Noted: 2024-01-25

## 2024-01-25 PROBLEM — R63.6 UNDERWEIGHT: Status: ACTIVE | Noted: 2024-01-25

## 2024-01-25 PROBLEM — I50.23 ACUTE ON CHRONIC SYSTOLIC CONGESTIVE HEART FAILURE: Status: ACTIVE | Noted: 2024-01-25

## 2024-01-25 PROBLEM — I47.19 JUNCTIONAL TACHYCARDIA: Status: ACTIVE | Noted: 2024-01-25

## 2024-01-25 PROBLEM — R53.1 WEAKNESS: Status: ACTIVE | Noted: 2024-01-25

## 2024-01-25 PROBLEM — J06.9 UPPER RESPIRATORY INFECTION: Status: ACTIVE | Noted: 2024-01-25

## 2024-01-26 PROBLEM — R50.9 FEVER: Status: ACTIVE | Noted: 2024-01-26

## 2024-01-27 PROBLEM — I50.23 ACUTE ON CHRONIC SYSTOLIC CONGESTIVE HEART FAILURE: Status: RESOLVED | Noted: 2024-01-25 | Resolved: 2024-01-27

## 2024-01-27 PROBLEM — A41.9 SEPSIS: Status: ACTIVE | Noted: 2024-01-27

## 2024-01-28 PROBLEM — A41.9 SEPSIS: Status: RESOLVED | Noted: 2024-01-27 | Resolved: 2024-01-28

## 2024-01-31 PROBLEM — I49.3 PVC (PREMATURE VENTRICULAR CONTRACTION): Status: ACTIVE | Noted: 2024-01-31

## 2024-03-24 PROBLEM — I10 HYPERTENSION: Status: ACTIVE | Noted: 2024-03-24

## 2024-06-17 DIAGNOSIS — I50.42 CHRONIC COMBINED SYSTOLIC AND DIASTOLIC HEART FAILURE: ICD-10-CM

## 2024-06-18 RX ORDER — FUROSEMIDE 20 MG/1
TABLET ORAL
Qty: 90 TABLET | Refills: 3 | Status: SHIPPED | OUTPATIENT
Start: 2024-06-18

## 2024-06-18 RX ORDER — ATORVASTATIN CALCIUM 80 MG/1
80 TABLET, FILM COATED ORAL NIGHTLY
Qty: 90 TABLET | Refills: 3 | Status: SHIPPED | OUTPATIENT
Start: 2024-06-18

## 2024-09-01 PROBLEM — I50.20 HFREF (HEART FAILURE WITH REDUCED EJECTION FRACTION): Status: ACTIVE | Noted: 2024-09-01

## 2024-09-01 PROBLEM — R07.9 CHEST PAIN: Status: ACTIVE | Noted: 2019-11-16

## 2024-09-02 PROBLEM — R07.9 CHEST PAIN: Status: RESOLVED | Noted: 2019-11-16 | Resolved: 2024-09-02

## 2024-09-04 ENCOUNTER — PATIENT MESSAGE (OUTPATIENT)
Dept: ADMINISTRATIVE | Facility: CLINIC | Age: 64
End: 2024-09-04

## 2024-09-04 ENCOUNTER — PATIENT OUTREACH (OUTPATIENT)
Dept: ADMINISTRATIVE | Facility: CLINIC | Age: 64
End: 2024-09-04

## 2024-09-04 NOTE — PROGRESS NOTES
2nd attempt-C3 nurse attempted to contact Rodney Lerma for a TCC post hospital discharge follow up call. No answer. Left voicemail with callback information. The patient has a scheduled HOSFU appointment with [facility] on 9/23/24 @ 11:00am, which was rescheduled from the original date of 9/9/24.

## 2024-09-04 NOTE — PROGRESS NOTES
C3 nurse spoke with Rodney Lerma, and his wife, Jossy, for a TCC post hospital discharge follow up call. The patient has a scheduled HOSFU appointment with [facility] on 9/23/24 @ 11:00am, which was rescheduled from the original date of 9/9/24.

## 2024-09-04 NOTE — PROGRESS NOTES
C3 nurse attempted to contact Rodney Lerma for a TCC post hospital discharge follow up call. No answer. Left voicemail with callback information. The patient has a scheduled HOSFU appointment with [facility] Unit on 9/23/24 @ 11:00am, which was rescheduled from the original date of 9/9/24.